# Patient Record
Sex: MALE | Race: AMERICAN INDIAN OR ALASKA NATIVE
[De-identification: names, ages, dates, MRNs, and addresses within clinical notes are randomized per-mention and may not be internally consistent; named-entity substitution may affect disease eponyms.]

---

## 2022-08-08 ENCOUNTER — HOSPITAL ENCOUNTER (INPATIENT)
Dept: HOSPITAL 5 - ED | Age: 77
LOS: 3 days | Discharge: HOME | DRG: 392 | End: 2022-08-11
Attending: STUDENT IN AN ORGANIZED HEALTH CARE EDUCATION/TRAINING PROGRAM | Admitting: HOSPITALIST
Payer: SELF-PAY

## 2022-08-08 DIAGNOSIS — D69.6: ICD-10-CM

## 2022-08-08 DIAGNOSIS — K29.70: ICD-10-CM

## 2022-08-08 DIAGNOSIS — R13.10: Primary | ICD-10-CM

## 2022-08-08 DIAGNOSIS — K44.9: ICD-10-CM

## 2022-08-08 PROCEDURE — 74176 CT ABD & PELVIS W/O CONTRAST: CPT

## 2022-08-08 PROCEDURE — 80053 COMPREHEN METABOLIC PANEL: CPT

## 2022-08-08 PROCEDURE — 83690 ASSAY OF LIPASE: CPT

## 2022-08-08 PROCEDURE — 82962 GLUCOSE BLOOD TEST: CPT

## 2022-08-08 PROCEDURE — 85025 COMPLETE CBC W/AUTO DIFF WBC: CPT

## 2022-08-08 PROCEDURE — 94640 AIRWAY INHALATION TREATMENT: CPT

## 2022-08-08 PROCEDURE — 36415 COLL VENOUS BLD VENIPUNCTURE: CPT

## 2022-08-08 PROCEDURE — 88312 SPECIAL STAINS GROUP 1: CPT

## 2022-08-08 PROCEDURE — 80048 BASIC METABOLIC PNL TOTAL CA: CPT

## 2022-08-08 PROCEDURE — 88305 TISSUE EXAM BY PATHOLOGIST: CPT

## 2022-08-08 NOTE — EMERGENCY DEPARTMENT REPORT
Stated Complaint: PAIN IN CHEST AFTER EATING





- HPI


History of Present Illness: 





77-year-old male with no significant past medical history has been eating but 

reports that he feels like food is getting stuck in his esophagus.  And after 

eating he vomits his food back up.  For about a month now.  No other acute 

symptoms reported.  Patient is able to drink and stay hydrated but with food 

just feels if they get stuck in his throat or just EXTR.





- ROS


Review of Systems: 





After eating patient reports food seems to be lodged in throat.  No other acute 

symptoms reported denies chest pain, shortness of breath, dizziness, weakness.





- Exam


Vital Signs: 


                                   Vital Signs











  08/08/22





  12:57


 


Temperature 98.1 F


 


Pulse Rate 116 H


 


Respiratory 20





Rate 


 


Blood Pressure 148/97





[Right] 


 


O2 Sat by Pulse 100





Oximetry 











Physical Exam: 





No acute distress


A/O x4


Patient amatory with no assistance.


Nonlabored breathing.


Skin dry clean and intact.








MSE screening note: 


Focused history and physical exam performed.


Due to findings the following was ordered:





MSE complete.


Orders to be placed.


Patient to be seen by another provider in the back.


Triage complete.





ED Disposition for MSE


Condition: Stable

## 2022-08-09 ENCOUNTER — OUT OF OFFICE VISIT (OUTPATIENT)
Dept: URBAN - METROPOLITAN AREA MEDICAL CENTER 41 | Facility: MEDICAL CENTER | Age: 77
End: 2022-08-09
Payer: SELF-PAY

## 2022-08-09 DIAGNOSIS — K22.2 ACQUIRED ESOPHAGEAL RING: ICD-10-CM

## 2022-08-09 DIAGNOSIS — K29.60 ADENOPAPILLOMATOSIS GASTRICA: ICD-10-CM

## 2022-08-09 DIAGNOSIS — R11.2 ACUTE NAUSEA WITH NONBILIOUS VOMITING: ICD-10-CM

## 2022-08-09 DIAGNOSIS — R63.0 ALMOST NO APPETITE: ICD-10-CM

## 2022-08-09 DIAGNOSIS — K20.80 ESOPHAGITIS DISSECANS SUPERFICIALIS: ICD-10-CM

## 2022-08-09 DIAGNOSIS — B96.81 BACTERIAL INFECTION DUE TO H. PYLORI: ICD-10-CM

## 2022-08-09 DIAGNOSIS — R13.19 CERVICAL DYSPHAGIA: ICD-10-CM

## 2022-08-09 LAB
ALBUMIN SERPL-MCNC: 4 G/DL (ref 3.9–5)
ALT SERPL-CCNC: 30 UNITS/L (ref 7–56)
BASOPHILS # (AUTO): 0 K/MM3 (ref 0–0.1)
BASOPHILS NFR BLD AUTO: 0.5 % (ref 0–1.8)
BUN SERPL-MCNC: 13 MG/DL (ref 9–20)
BUN/CREAT SERPL: 16 %
CALCIUM SERPL-MCNC: 8.9 MG/DL (ref 8.4–10.2)
EOSINOPHIL # BLD AUTO: 0.2 K/MM3 (ref 0–0.4)
EOSINOPHIL NFR BLD AUTO: 2.5 % (ref 0–4.3)
HCT VFR BLD CALC: 37.7 % (ref 35.5–45.6)
HEMOLYSIS INDEX: 10
HGB BLD-MCNC: 13 GM/DL (ref 11.8–15.2)
LYMPHOCYTES # BLD AUTO: 2.7 K/MM3 (ref 1.2–5.4)
LYMPHOCYTES NFR BLD AUTO: 36.9 % (ref 13.4–35)
MCHC RBC AUTO-ENTMCNC: 35 % (ref 32–34)
MCV RBC AUTO: 78 FL (ref 84–94)
MONOCYTES # (AUTO): 0.7 K/MM3 (ref 0–0.8)
MONOCYTES % (AUTO): 9.4 % (ref 0–7.3)
PLATELET # BLD: 76 K/MM3 (ref 140–440)
RBC # BLD AUTO: 4.8 M/MM3 (ref 3.65–5.03)

## 2022-08-09 PROCEDURE — 99254 IP/OBS CNSLTJ NEW/EST MOD 60: CPT | Performed by: INTERNAL MEDICINE

## 2022-08-09 PROCEDURE — 99245 OFF/OP CONSLTJ NEW/EST HI 55: CPT | Performed by: INTERNAL MEDICINE

## 2022-08-09 PROCEDURE — 99232 SBSQ HOSP IP/OBS MODERATE 35: CPT | Performed by: INTERNAL MEDICINE

## 2022-08-09 PROCEDURE — 43239 EGD BIOPSY SINGLE/MULTIPLE: CPT | Performed by: INTERNAL MEDICINE

## 2022-08-09 PROCEDURE — 43249 ESOPH EGD DILATION <30 MM: CPT | Performed by: INTERNAL MEDICINE

## 2022-08-09 RX ADMIN — FAMOTIDINE SCH MG: 10 INJECTION, SOLUTION INTRAVENOUS at 11:26

## 2022-08-09 RX ADMIN — Medication SCH ML: at 22:01

## 2022-08-09 RX ADMIN — Medication SCH ML: at 10:25

## 2022-08-09 RX ADMIN — FAMOTIDINE SCH MG: 10 INJECTION, SOLUTION INTRAVENOUS at 22:01

## 2022-08-09 RX ADMIN — HEPARIN SODIUM SCH UNIT: 5000 INJECTION, SOLUTION INTRAVENOUS; SUBCUTANEOUS at 15:25

## 2022-08-09 RX ADMIN — HEPARIN SODIUM SCH UNIT: 5000 INJECTION, SOLUTION INTRAVENOUS; SUBCUTANEOUS at 22:01

## 2022-08-09 NOTE — HISTORY AND PHYSICAL REPORT
History of Present Illness


Date of examination: 08/09/22


Date of admission: 


08/09/22


Chief complaint: 





Nausea vomiting diarrhea


History of present illness: 





77-year-old male with no significant past medical history was brought to the 

emergency room because of nausea vomiting.  The patient states for the past 

month he feels as though solid foods get stuck in his chest when he swallows.  

Patient also complains of diffuse abdominal pain when he is full.  The patient e

xplains that whenever he eats solid food feels full and  eventually vomits.  

Patient states he is able to consume liquids.  Patient admits to watery 

diarrhea.  Patient has not yet seen a physician about the above complaints.





In the emergency room CT scan of the abdomen and pelvis without contrast shows 

no acute finding of the abdomen or pelvis.  No evidence of localized bowel 

inflammation or obstruction.  Subsequently Case was discussed with on-call GI 

fellow going to admit the patient and GI will see the patient in the morning








Past History


Past Surgical History: No surgical history


Social history: no significant social history


Family history: no significant family history





Medications and Allergies


                                    Allergies











Allergy/AdvReac Type Severity Reaction Status Date / Time


 


No Known Allergies Allergy   Unverified 08/08/22 13:02














Review of Systems


All systems: negative


Gastrointestinal: abdominal pain, nausea, vomiting, diarrhea





Exam





- Constitutional


Vitals: 


                                        











Temp Pulse Resp BP Pulse Ox


 


 98.1 F   96 H  12   145/95   98 


 


 08/08/22 12:57  08/09/22 04:31  08/09/22 04:31  08/09/22 04:31  08/09/22 04:31











General appearance: Present: no acute distress, well-nourished





- EENT


Eyes: Present: PERRL


ENT: hearing intact, clear oral mucosa





- Neck


Neck: Present: supple, normal ROM





- Respiratory


Respiratory effort: normal


Respiratory: bilateral: CTA





- Cardiovascular


Heart Sounds: Present: S1 & S2.  Absent: rub, click





- Extremities


Extremities: pulses symmetrical, No edema


Peripheral Pulses: within normal limits





- Abdominal


General gastrointestinal: Present: soft, non-tender, non-distended, normal bowel

sounds


Male genitourinary: Present: normal





- Integumentary


Integumentary: Present: clear, warm, dry





- Musculoskeletal


Musculoskeletal: gait normal, strength equal bilaterally





- Psychiatric


Psychiatric: appropriate mood/affect, intact judgment & insight





- Neurologic


Neurologic: CNII-XII intact, moves all extremities





Results





- Labs


CBC & Chem 7: 


                                 08/09/22 01:58





                                 08/09/22 01:58


Labs: 


                             Laboratory Last Values











WBC  7.2 K/mm3 (4.5-11.0)   08/09/22  01:58    


 


RBC  4.80 M/mm3 (3.65-5.03)   08/09/22  01:58    


 


Hgb  13.0 gm/dl (11.8-15.2)   08/09/22  01:58    


 


Hct  37.7 % (35.5-45.6)   08/09/22  01:58    


 


MCV  78 fl (84-94)  L  08/09/22  01:58    


 


MCH  27 pg (28-32)  L  08/09/22  01:58    


 


MCHC  35 % (32-34)  H  08/09/22  01:58    


 


RDW  15.9 % (13.2-15.2)  H  08/09/22  01:58    


 


Plt Count  76 K/mm3 (140-440)  L  08/09/22  01:58    


 


Lymph % (Auto)  36.9 % (13.4-35.0)  H  08/09/22  01:58    


 


Mono % (Auto)  9.4 % (0.0-7.3)  H  08/09/22  01:58    


 


Eos % (Auto)  2.5 % (0.0-4.3)   08/09/22  01:58    


 


Baso % (Auto)  0.5 % (0.0-1.8)   08/09/22  01:58    


 


Lymph # (Auto)  2.7 K/mm3 (1.2-5.4)   08/09/22  01:58    


 


Mono # (Auto)  0.7 K/mm3 (0.0-0.8)   08/09/22  01:58    


 


Eos # (Auto)  0.2 K/mm3 (0.0-0.4)   08/09/22  01:58    


 


Baso # (Auto)  0.0 K/mm3 (0.0-0.1)   08/09/22  01:58    


 


Seg Neutrophils %  50.7 % (40.0-70.0)   08/09/22  01:58    


 


Seg Neutrophils #  3.6 K/mm3 (1.8-7.7)   08/09/22  01:58    


 


Sodium  136 mmol/L (137-145)  L  08/09/22  01:58    


 


Potassium  4.5 mmol/L (3.6-5.0)   08/09/22  01:58    


 


Chloride  102.9 mmol/L ()   08/09/22  01:58    


 


Carbon Dioxide  20 mmol/L (22-30)  L  08/09/22  01:58    


 


Anion Gap  18 mmol/L  08/09/22  01:58    


 


BUN  13 mg/dL (9-20)   08/09/22  01:58    


 


Creatinine  0.8 mg/dL (0.8-1.3)   08/09/22  01:58    


 


Estimated GFR  > 60 ml/min  08/09/22  01:58    


 


BUN/Creatinine Ratio  16 %  08/09/22  01:58    


 


Glucose  127 mg/dL ()  H  08/09/22  01:58    


 


Calcium  8.9 mg/dL (8.4-10.2)   08/09/22  01:58    


 


Total Bilirubin  1.80 mg/dL (0.1-1.2)  H  08/09/22  01:58    


 


AST  14 units/L (5-40)   08/09/22  01:58    


 


ALT  30 units/L (7-56)   08/09/22  01:58    


 


Alkaline Phosphatase  111 units/L ()   08/09/22  01:58    


 


Total Protein  8.0 g/dL (6.3-8.2)   08/09/22  01:58    


 


Albumin  4.0 g/dL (3.9-5)   08/09/22  01:58    


 


Albumin/Globulin Ratio  1.0 %  08/09/22  01:58    


 


Lipase  43 units/L (13-60)   08/09/22  01:58    














- Imaging and Cardiology


CT scan - abdomen: report reviewed





Assessment and Plan


VTE prophylaxis?: Chemical


Plan of care discussed with patient/family: Yes





- Patient Problems


(1) Dysphagia


Current Visit: Yes   Status: Acute   


Plan to address problem: 


Admit the patient to the medical floor telemetry.  NPO.  D5 half-normal saline 

at the rate of 100 cc/h.  Pepcid 20 mg IV every 12 hours.  Zofran 4 mg IV every 

6 hours as needed.  Will consult GI for further evaluation








(2) Nausea & vomiting


Current Visit: Yes   Status: Acute   


Plan to address problem: 


 NPO.  D5 half-normal saline at the rate of 100 cc/h.  Pepcid 20 mg IV every 12 

hours.  Zofran 4 mg IV every 6 hours as needed.  








(3) Abdominal pain


Current Visit: Yes   Status: Acute   


Plan to address problem: 


 NPO.  D5 half-normal saline at the rate of 100 cc/h.  Pepcid 20 mg IV every 12 

hours.  Zofran 4 mg IV every 6 hours as needed.  Morphine 2 mg IV every 4 hours 

as needed








(4) DVT prophylaxis


Current Visit: Yes   Status: Acute   


Plan to address problem: 


Heparin 5000 units subcu every 12 hours for DVT prophylaxis.  Pepcid 20 mg IV 

every 12 hours for GI prophylaxis.  Patient is a full code

## 2022-08-09 NOTE — EVENT NOTE
Date: 08/09/22


Full GI consult dictated


- pt w/ 3 months dysphagia


- EGD in am if schedule permits

## 2022-08-09 NOTE — CAT SCAN REPORT
CT ABDOMEN AND PELVIS WITHOUT CONTRAST



INDICATION / CLINICAL INFORMATION: Intractable n/v, intolerance of solid food.



TECHNIQUE: Axial CT images were obtained through the abdomen and pelvis without IV contrast.  All CT 
scans at this location are performed using CT dose reduction for ALARA by means of automated exposure
 control. 



COMPARISON: None available.



FINDINGS:



LOWER CHEST: There is bibasilar volume loss.

LIVER: No significant abnormality

GALLBLADDER/BILIARY TREE: No significant abnormality  

PANCREAS: No significant abnormality

SPLEEN: Mildly enlarged, measuring 13.8 cm.

ADRENALS: No significant abnormality

RIGHT KIDNEY / URETER: No acute findings. Renal cysts.

LEFT KIDNEY / URETER: No acute findings. Tiny left renal cyst.

URINARY BLADDER: No significant abnormality

REPRODUCTIVE ORGANS: Prostate is enlarged.

STOMACH / BOWEL: Small bowel and colon demonstrate no evidence of mechanical obstruction or inflammat
ion. The appendix is normal.

LYMPH NODES: No significant adenopathy.

VASCULATURE: No significant abnormality. 

OTHER: No free air, free fluid, or focal fluid collection is identified. Small left inguinal hernia c
ontains noninflamed fat.



SKELETAL SYSTEM: No acute osseous findings.



IMPRESSION:



1. No acute findings of the abdomen or pelvis. No evidence of localized bowel inflammation or obstruc
tion.

2. Nonspecific mild splenomegaly.

3. Other incidental findings as above.



Signer Name: Derick Matos MD 

Signed: 8/9/2022 3:03 AM

Workstation Name: [id number]

## 2022-08-09 NOTE — EVENT NOTE
Date: 08/09/22





Patient was evaluated this morning, he was found to be hemodynamically stable.





#Dysphagia


#Nausea and vomiting


 Speech therapy consulted for bedside evaluation; pending recs.


 Gastroenterology consulted; pending recs.  Pending upper endoscopy tomorrow 

morning with GI.


 Patient currently NPO.  Continue D5 half-normal saline at 100 cc/hour.


Continue as needed analgesics.  Continue IV Pepcid 20 mg twice daily.





#Hyperbilirubinemia


- Total bilirubin 1.8


- Continue to monitor with repeat CMP. 





#Thrombocytopenia


- platelets 76. Transfuse if platelets <50K and pending procedure, <20K with 

cutaneous bleeding, or <15K. 


- continue to monitor. 





#Coordination of CARE time: 30 minutes.  Total visit time equals 30 or more 

minutes with greater than 50% spent face-to-face on coordination of care and 

counseling.





#Advanced care planning


-Disease education conducted, care plan discussed, diagnoses discussed, 

prognosis discussed, and patient acknowledges understanding with care plan


-Time: +30 min

## 2022-08-09 NOTE — EMERGENCY DEPARTMENT REPORT
HPI





- General


Chief Complaint: Nausea/Vomiting/Diarrhea


Time Seen by Provider: 22 01:17





- HPI


HPI: 





Room 22











The patient is a 77-year-old male present with chief complaint of nausea 

vomiting.  The patient states for the past month he feels as though solid foods 

get stuck in his chest when he swallows.  Patient also complains of diffuse 

abdominal pain when he is full.  The patient explains that whenever he eats 

solid food feels like his sister and eventually vomits.  Patient states he is 

able to consume liquids.  Patient admits to watery diarrhea.  Patient has not 

yet seen a physician about the above complaints.





ED Past Medical Hx





- Past Medical History


Previous Medical History?: No





- Surgical History


Past Surgical History?: No





- Family History


Family history: no significant





- Social History


Smoking Status: Never Smoker


Substance Use Type: None (Denies illicit drug use)





ED Review of Systems


ROS: 


Stated complaint: PAIN IN CHEST AFTER EATING


Other details as noted in HPI





Constitutional: denies: fever


Eyes: denies: eye pain


ENT: denies: throat pain


Cardiovascular: denies: chest pain


Endocrine: no symptoms reported


Gastrointestinal: abdominal pain, nausea, vomiting, diarrhea


Genitourinary: denies: dysuria


Musculoskeletal: denies: back pain


Neurological: denies: headache





Physical Exam





- Physical Exam


Vital Signs: 


                                   Vital Signs











  22





  12:57 01:19 01:31


 


Temperature 98.1 F  


 


Pulse Rate 116 H 98 H 98 H


 


Respiratory 20 16 16





Rate   


 


Blood Pressure   143/99


 


Blood Pressure 148/97  





[Right]   


 


O2 Sat by Pulse 100 98 100





Oximetry   











Physical Exam: 





GENERAL: The patient is well-developed well-nourished male lying on stretcher 

not appearing to be in acute distress. []


HEENT: Normocephalic.  Atraumatic.  Extraocular motions are intact.  Patient has

 moist mucous membranes.


NECK: Supple.  Trachea midline


CHEST/LUNGS: Clear to auscultation.  There is no respiratory distress noted.


HEART/CARDIOVASCULAR: Regular.  There is no tachycardia.  There is no gallop rub

 or murmur.


ABDOMEN: Abdomen is soft, nontender.  Patient has normal bowel sounds.  There is

 no abdominal distention.


SKIN: There is no rash.  There is no edema.  There is no diaphoresis.


NEURO: The patient is awake, alert, and oriented.  The patient is cooperative.  

The patient has no focal neurologic deficits.  The patient has normal speech.  

GCS 15


MUSCULOSKELETAL:There is no evidence of acute injury.





ED Course


                                   Vital Signs











  22





  12:57 01:19 01:31


 


Temperature 98.1 F  


 


Pulse Rate 116 H 98 H 98 H


 


Respiratory 20 16 16





Rate   


 


Blood Pressure   143/99


 


Blood Pressure 148/97  





[Right]   


 


O2 Sat by Pulse 100 98 100





Oximetry   














- Consultations


Consultation #1: 





22 03:26


GI paged


22 03:33


Case discussed with gastroenterologist Dr. Sims- agree with admission.  Will 

consult





ED Medical Decision Making





- Lab Data


Result diagrams: 


                                 22 01:58





                                 22 01:58





                                Laboratory Tests











  22





  01:58 01:58


 


WBC  7.2 


 


RBC  4.80 


 


Hgb  13.0 


 


Hct  37.7 


 


MCV  78 L 


 


MCH  27 L 


 


MCHC  35 H 


 


RDW  15.9 H 


 


Plt Count  76 L 


 


Lymph % (Auto)  36.9 H 


 


Mono % (Auto)  9.4 H 


 


Eos % (Auto)  2.5 


 


Baso % (Auto)  0.5 


 


Lymph # (Auto)  2.7 


 


Mono # (Auto)  0.7 


 


Eos # (Auto)  0.2 


 


Baso # (Auto)  0.0 


 


Seg Neutrophils %  50.7 


 


Seg Neutrophils #  3.6 


 


Sodium   136 L


 


Potassium   4.5


 


Chloride   102.9


 


Carbon Dioxide   20 L


 


Anion Gap   18


 


BUN   13


 


Creatinine   0.8


 


Estimated GFR   > 60


 


BUN/Creatinine Ratio   16


 


Glucose   127 H


 


Calcium   8.9


 


Total Bilirubin   1.80 H


 


AST   14


 


ALT   30


 


Alkaline Phosphatase   111


 


Total Protein   8.0


 


Albumin   4.0


 


Albumin/Globulin Ratio   1.0


 


Lipase   43














- Radiology Data


Radiology results: report reviewed (CT abdomen pelvis), image reviewed (CT 

abdomen pelvis)





[facility]  


                                     [address] [place] [address]  


 


                                          Cat Scan Report   


                                               Signed  


 


Patient: DAVID GUERRERO                                                             

  MR#: [id number]    


 


: 1945                                                                

Acct:[id number]      


 


Age/Sex: 77 / M                                                                

ADM Date: 22     


 


Loc: ED       


Attending Dr:   


 


 


Ordering Physician: NEISHA PIMENTEL MD  


Date of Service: 22  


Procedure(s): CT abdomen pelvis wo con  


Accession Number(s): [id number]  


 


cc: NEISHA PIMENTEL MD   


 


 


CT ABDOMEN AND PELVIS WITHOUT CONTRAST  


 


 INDICATION / CLINICAL INFORMATION: Intractable n/v, intolerance of solid food. 




 


 TECHNIQUE: Axial CT images were obtained through the abdomen and pelvis without

IV contrast.  All 


CT scans at this location are performed using CT dose reduction for ALARA by 

means of automated 


exposure control.   


 


 COMPARISON: None available.  


 


 FINDINGS:  


 


 LOWER CHEST: There is bibasilar volume loss.  


 LIVER: No significant abnormality  


 GALLBLADDER/BILIARY TREE: No significant abnormality    


 PANCREAS: No significant abnormality  


 SPLEEN: Mildly enlarged, measuring 13.8 cm.  


 ADRENALS: No significant abnormality  


 RIGHT KIDNEY / URETER: No acute findings. Renal cysts.  


 LEFT KIDNEY / URETER: No acute findings. Tiny left renal cyst.  


 URINARY BLADDER: No significant abnormality  


 REPRODUCTIVE ORGANS: Prostate is enlarged.  


 STOMACH / BOWEL: Small bowel and colon demonstrate no evidence of mechanical 

obstruction or 


inflammation. The appendix is normal.  


 LYMPH NODES: No significant adenopathy.  


 VASCULATURE: No significant abnormality.   


 OTHER: No free air, free fluid, or focal fluid collection is identified. Small 

left inguinal hernia


contains noninflamed fat.  


 


 SKELETAL SYSTEM: No acute osseous findings.  


 


 IMPRESSION:  


 


 1. No acute findings of the abdomen or pelvis. No evidence of localized bowel 

inflammation or 


obstruction.  


 2. Nonspecific mild splenomegaly.  


 3. Other incidental findings as above.  


 


 Signer Name: Michelle Matos MD   


 Signed: 2022 3:03 AM  


 Workstation Name: [id number]   


 


 


Transcribed By: JS  


Dictated By: MICHELLE MATOS MD  


Electronically Authenticated By: MICHELLE MATOS MD    


Signed Date/Time: 22 0303                                


 


 


 


DD/DT: 22 0259                                                            

 


TD/TT:











- Differential Diagnosis


Achalasia, esophageal web, esophageal ring, small bowel obstruction


Critical care attestation.: 


If time is entered above; I have spent that time in minutes in the direct care 

of this critically ill patient, excluding procedure time.








ED Disposition


Clinical Impression: 


 Dysphagia





Disposition:  ADMITTED AS INPATIENT


Is pt being admited?: Yes


Does the pt Need Aspirin: No


Condition: Fair


Referrals: 


PRIMARY CARE,MD [Primary Care Provider] - 3-5 Days


Time of Disposition: 03:34 (Care transferred to hospitalist (Dr. Robertson))

## 2022-08-10 LAB
ALBUMIN SERPL-MCNC: 3.8 G/DL (ref 3.9–5)
ALT SERPL-CCNC: 30 UNITS/L (ref 7–56)
BASOPHILS # (AUTO): 0 K/MM3 (ref 0–0.1)
BASOPHILS NFR BLD AUTO: 0.4 % (ref 0–1.8)
BUN SERPL-MCNC: 10 MG/DL (ref 9–20)
BUN/CREAT SERPL: 11 %
CALCIUM SERPL-MCNC: 8.6 MG/DL (ref 8.4–10.2)
EOSINOPHIL # BLD AUTO: 0.3 K/MM3 (ref 0–0.4)
EOSINOPHIL NFR BLD AUTO: 5.2 % (ref 0–4.3)
HCT VFR BLD CALC: 34.8 % (ref 35.5–45.6)
HEMOLYSIS INDEX: 0
HGB BLD-MCNC: 12.1 GM/DL (ref 11.8–15.2)
LYMPHOCYTES # BLD AUTO: 2.3 K/MM3 (ref 1.2–5.4)
LYMPHOCYTES NFR BLD AUTO: 43.1 % (ref 13.4–35)
MCHC RBC AUTO-ENTMCNC: 35 % (ref 32–34)
MCV RBC AUTO: 79 FL (ref 84–94)
MONOCYTES # (AUTO): 0.5 K/MM3 (ref 0–0.8)
MONOCYTES % (AUTO): 10.1 % (ref 0–7.3)
PLATELET # BLD: 66 K/MM3 (ref 140–440)
RBC # BLD AUTO: 4.42 M/MM3 (ref 3.65–5.03)

## 2022-08-10 RX ADMIN — FAMOTIDINE SCH MG: 10 INJECTION, SOLUTION INTRAVENOUS at 10:06

## 2022-08-10 RX ADMIN — FAMOTIDINE SCH MG: 10 INJECTION, SOLUTION INTRAVENOUS at 21:57

## 2022-08-10 RX ADMIN — Medication SCH ML: at 10:06

## 2022-08-10 RX ADMIN — Medication SCH ML: at 21:57

## 2022-08-10 RX ADMIN — HEPARIN SODIUM SCH: 5000 INJECTION, SOLUTION INTRAVENOUS; SUBCUTANEOUS at 05:38

## 2022-08-10 NOTE — CONSULTATION
DATE OF CONSULTATION: 08/09/2022



REFERRING PHYSICIAN:  Dr. Isabel Calhoun.



INDICATIONS:

1.  Nausea.

2.  Dysphagia.



HISTORY OF PRESENT ILLNESS:  A 77-year-old black male presents with nausea and 

vomiting.  The patient reports for the last 3 months, he has had progressive 

dysphagia where food is getting stuck along the mid esophagus.  This led to 

decreased appetite and some weight loss.  The patient denies a history of reflux

prior.  The patient did report some nausea and vomiting.  The patient reports 

some loose stools.  The patient subsequently came to the Emergency Room where he

was evaluated, admitted and GI consulted.  The patient denies any other specific

complaints.



PAST MEDICAL HISTORY:  Negative.



PAST SURGICAL HISTORY:  Negative.



MEDICATIONS:  Reviewed and updated in chart.



ALLERGIES:  No known drug allergies.



SOCIAL HISTORY:  Denies alcohol, tobacco or drug abuse.



FAMILY HISTORY: Negative for colon cancer, IBD, or liver disease..



REVIEW OF SYSTEMS:

GENERAL:  Reports some weakness.

HEENT:  Denies visual complaints or tinnitus.

PULMONARY:  No shortness of breath, chest pain.

GASTROINTESTINAL:  Reports problems swallowing and dysphagia.

All points of 13-point review of system otherwise negative.



PHYSICAL EXAMINATION:

VITAL SIGNS:  Temperature of 98.1, pulse 86, respirations 18, blood pressure 

138/94.

GENERAL:  Fairly thin black male in no acute distress.

HEENT:  Pupils round and reactive.

PULMONARY:  Clear to auscultation bilaterally.

CARDIOVASCULAR:  Regular rate and rhythm.  Normal S1, S2.

ABDOMEN:  Positive bowel sounds, soft.

SKIN:  No obvious rashes.



LABORATORY DATA:  Pertinent for white count of 7.2, hemoglobin and hematocrit of

13 and 37.7, platelet count of 76.  Chem-7 within normal limits.  LFTs within 

normal limits.  CT scan abdomen and pelvis without contrast on 08/08/2022 showed

no significant intraabdominal process except for mild splenomegaly.



ASSESSMENT:  A 77-year-old black male presents with 3 months of dysphagia to 

solids progressively worse with decreased appetite and nausea, vomiting and 

unable to get food down.  The patient's CT scan was otherwise benign.  He does 

report some weight loss.  Concern for upper esophageal pathology.



PLAN:

1.  Will review CT scan.

2.  N.p.o. after midnight.

3.  PPI daily.

4.  Planned EGD with possible dilatation in a.m.







DD: 08/09/2022 03:10 PM

DT: 08/10/2022 03:06 AM

TID: [id number] RECEIPT: [id number]

FAITH/MAR

## 2022-08-10 NOTE — GASTROENTEROLOGY PROGRESS NOTE
Assessment and Plan


1. GI: pt w/ dysphagia


- egd in am


- continue current meds and diet








Subjective


Date of service: 08/10/22


Interval history: 


- no new GI issues overnight








Objective





- Constitutional


Vitals: 


                                        











Temp Pulse Resp BP Pulse Ox


 


 99.0 F   94 H  18   121/84   98 


 


 08/10/22 13:21  08/10/22 13:21  08/10/22 13:21  08/10/22 13:21  08/10/22 13:50











General appearance: no acute distress





- EENT


Eyes: PERRL





- Cardiovascular


Rhythm: regular


Heart Sounds: Present: S1 & S2





- Gastrointestinal


General gastrointestinal: Present: soft, non-tender, non-distended





- Labs


CBC & Chem 7: 


                                 08/10/22 04:33





                                 08/10/22 04:33


Labs: 


                         Laboratory Results - last 24 hr











  08/10/22 08/10/22





  04:33 04:33


 


WBC  5.3 


 


RBC  4.42 


 


Hgb  12.1 


 


Hct  34.8 L 


 


MCV  79 L 


 


MCH  27 L 


 


MCHC  35 H 


 


RDW  15.9 H 


 


Plt Count  66 L 


 


Lymph % (Auto)  43.1 H 


 


Mono % (Auto)  10.1 H 


 


Eos % (Auto)  5.2 H 


 


Baso % (Auto)  0.4 


 


Lymph # (Auto)  2.3 


 


Mono # (Auto)  0.5 


 


Eos # (Auto)  0.3 


 


Baso # (Auto)  0.0 


 


Seg Neutrophils %  41.2 


 


Seg Neutrophils #  2.2 


 


Sodium   137


 


Potassium   3.9


 


Chloride   105.2


 


Carbon Dioxide   25


 


Anion Gap   11


 


BUN   10


 


Creatinine   0.9


 


Estimated GFR   > 60


 


BUN/Creatinine Ratio   11


 


Glucose   118 H


 


Calcium   8.6


 


Total Bilirubin   1.90 H


 


AST   13


 


ALT   30


 


Alkaline Phosphatase   97


 


Total Protein   7.5


 


Albumin   3.8 L


 


Albumin/Globulin Ratio   1.0

## 2022-08-10 NOTE — PROGRESS NOTE
Assessment and Plan


Assessment and plan: 





Patient was evaluated this morning, he was found to be hemodynamically stable.





#Dysphagia


#Nausea and vomiting


 Speech therapy consulted for bedside evaluation; pending recs.


 Gastroenterology consulted; pending recs.  Pending upper endoscopy today with 

GI.


 Patient currently NPO.  Continue D5 half-normal saline at 100 cc/hour.


 Continue as needed antiemetics.  Continue IV Pepcid 20 mg twice daily.





#Hyperbilirubinemia


- Total bilirubin 1.8


- Continue to monitor with repeat CMP. 





#Thrombocytopeniaworsening


- platelets 76-->66


 Transfuse if platelets <50K and pending procedure, <20K with cutaneous 

bleeding, or <15K. 


- continue to monitor. 





#Advanced care planning


-Disease education conducted, care plan discussed, diagnoses discussed, 

prognosis discussed, and patient acknowledges understanding with care plan


-Time: +30 min


Disposition Plan: Continue medical management


Total Time Spent with Patient (Minutes): 45 minutes





History


Interval history: 





No acute events over night. The patient denies fevers, chills, nausea, vomiting,

abdominal pain, chest pain/pressure, shortness of breath, urinary symptoms, 

weakness, or confusion.





Hospitalist Physical





- Constitutional


Vitals: 


                                        











Temp Pulse Resp BP Pulse Ox


 


 98.1 F   90   20   136/83   97 


 


 08/09/22 16:49  08/09/22 21:12  08/09/22 21:12  08/09/22 21:12  08/10/22 09:20











General appearance: Present: no acute distress, well-nourished





- EENT


Eyes: Present: PERRL, EOM intact


ENT: hearing intact, clear oral mucosa, dentition normal





- Neck


Neck: Present: supple, normal ROM





- Respiratory


Respiratory effort: normal


Respiratory: bilateral: CTA





- Cardiovascular


Rhythm: regular


Heart Sounds: Present: S1 & S2





- Extremities


Extremities: no ischemia, pulses intact, pulses symmetrical, No edema, normal 

temperature, normal color, Full ROM


Peripheral Pulses: within normal limits





- Abdominal


General gastrointestinal: soft, non-tender, non-distended, normal bowel sounds





- Integumentary


Integumentary: Present: clear, warm, dry





- Psychiatric


Psychiatric: appropriate mood/affect, intact judgment & insight, memory intact, 

cooperative





- Neurologic


Neurologic: CNII-XII intact, moves all extremities, gait normal





- Allied Health


Allied health notes reviewed: nursing





Results





- Labs


CBC & Chem 7: 


                                 08/10/22 04:33





                                 08/10/22 04:33


Labs: 


                             Laboratory Last Values











WBC  5.3 K/mm3 (4.5-11.0)   08/10/22  04:33    


 


RBC  4.42 M/mm3 (3.65-5.03)   08/10/22  04:33    


 


Hgb  12.1 gm/dl (11.8-15.2)   08/10/22  04:33    


 


Hct  34.8 % (35.5-45.6)  L  08/10/22  04:33    


 


MCV  79 fl (84-94)  L  08/10/22  04:33    


 


MCH  27 pg (28-32)  L  08/10/22  04:33    


 


MCHC  35 % (32-34)  H  08/10/22  04:33    


 


RDW  15.9 % (13.2-15.2)  H  08/10/22  04:33    


 


Plt Count  66 K/mm3 (140-440)  L  08/10/22  04:33    


 


Lymph % (Auto)  43.1 % (13.4-35.0)  H  08/10/22  04:33    


 


Mono % (Auto)  10.1 % (0.0-7.3)  H  08/10/22  04:33    


 


Eos % (Auto)  5.2 % (0.0-4.3)  H  08/10/22  04:33    


 


Baso % (Auto)  0.4 % (0.0-1.8)   08/10/22  04:33    


 


Lymph # (Auto)  2.3 K/mm3 (1.2-5.4)   08/10/22  04:33    


 


Mono # (Auto)  0.5 K/mm3 (0.0-0.8)   08/10/22  04:33    


 


Eos # (Auto)  0.3 K/mm3 (0.0-0.4)   08/10/22  04:33    


 


Baso # (Auto)  0.0 K/mm3 (0.0-0.1)   08/10/22  04:33    


 


Seg Neutrophils %  41.2 % (40.0-70.0)   08/10/22  04:33    


 


Seg Neutrophils #  2.2 K/mm3 (1.8-7.7)   08/10/22  04:33    


 


Sodium  137 mmol/L (137-145)   08/10/22  04:33    


 


Potassium  3.9 mmol/L (3.6-5.0)   08/10/22  04:33    


 


Chloride  105.2 mmol/L ()   08/10/22  04:33    


 


Carbon Dioxide  25 mmol/L (22-30)   08/10/22  04:33    


 


Anion Gap  11 mmol/L  08/10/22  04:33    


 


BUN  10 mg/dL (9-20)   08/10/22  04:33    


 


Creatinine  0.9 mg/dL (0.8-1.3)   08/10/22  04:33    


 


Estimated GFR  > 60 ml/min  08/10/22  04:33    


 


BUN/Creatinine Ratio  11 %  08/10/22  04:33    


 


Glucose  118 mg/dL ()  H  08/10/22  04:33    


 


POC Glucose  111 mg/dL ()  H  08/09/22  08:25    


 


Calcium  8.6 mg/dL (8.4-10.2)   08/10/22  04:33    


 


Total Bilirubin  1.90 mg/dL (0.1-1.2)  H  08/10/22  04:33    


 


AST  13 units/L (5-40)   08/10/22  04:33    


 


ALT  30 units/L (7-56)   08/10/22  04:33    


 


Alkaline Phosphatase  97 units/L ()   08/10/22  04:33    


 


Total Protein  7.5 g/dL (6.3-8.2)   08/10/22  04:33    


 


Albumin  3.8 g/dL (3.9-5)  L  08/10/22  04:33    


 


Albumin/Globulin Ratio  1.0 %  08/10/22  04:33    


 


Lipase  43 units/L (13-60)   08/09/22  01:58    











Sanders/IV: 


                                        





Voiding Method                   Toilet











Active Medications





- Current Medications


Current Medications: 














Generic Name Dose Route Start Last Admin





  Trade Name Freq  PRN Reason Stop Dose Admin


 


Acetaminophen  650 mg  08/09/22 06:00 





  Acetaminophen 325 Mg Tab  PO  





  Q4H PRN  





  Pain MILD(1-3)/Fever >100.5/HA  


 


Albuterol  2.5 mg  08/09/22 05:30 





  Albuterol 2.5 Mg/3 Ml Nebu  IH  





  Q3HRT PRN  





  Shortness Of Breath  


 


Albuterol/Ipratropium  1 ampul  08/09/22 08:33 





  Ipratropium/Albuterol Sulfate 3 Ml Ampul.Neb  IH  





  Q6HRT PRN  





  Shortness of breath  


 


Famotidine  20 mg  08/09/22 10:00  08/09/22 22:01





  Famotidine 20 Mg/2 Ml Inj  IV   20 mg





  BID CLARK   Administration


 


Heparin Sodium (Porcine)  5,000 unit  08/09/22 14:00  08/10/22 05:38





  Heparin 5,000 Unit/1 Ml Vial  SUB-Q   Not Given





  Q8HR CLARK  


 


Dextrose/Sodium Chloride  1,000 mls @ 100 mls/hr  08/09/22 06:00  08/10/22 05:33





  D5/0.45ns  IV   100 mls/hr





  AS DIRECT CLARK   Administration


 


Morphine Sulfate  2 mg  08/09/22 05:30 





  Morphine 2 Mg/1 Ml Inj  IV  





  Q4H PRN  





  Pain, Moderate (4-6)  


 


Morphine Sulfate  4 mg  08/09/22 06:00 





  Morphine 4 Mg/1 Ml Inj  IV  





  Q4H PRN  





  Pain , Severe (7-10)  


 


Ondansetron HCl  4 mg  08/09/22 06:00 





  Ondansetron 4 Mg/2 Ml Inj  IV  





  Q8H PRN  





  Nausea And Vomiting  


 


Sodium Chloride  10 ml  08/09/22 10:00  08/09/22 22:01





  Sodium Chloride 0.9% 10 Ml Flush Syringe  IV   10 ml





  BID CLARK   Administration


 


Sodium Chloride  10 ml  08/09/22 05:30 





  Sodium Chloride 0.9% 10 Ml Flush Syringe  IV  





  PRN PRN  





  LINE FLUSH

## 2022-08-11 ENCOUNTER — P2P PATIENT RECORD (OUTPATIENT)
Age: 77
End: 2022-08-11

## 2022-08-11 VITALS — DIASTOLIC BLOOD PRESSURE: 75 MMHG | SYSTOLIC BLOOD PRESSURE: 135 MMHG

## 2022-08-11 LAB
BASOPHILS # (AUTO): 0 K/MM3 (ref 0–0.1)
BASOPHILS NFR BLD AUTO: 0.5 % (ref 0–1.8)
BUN SERPL-MCNC: 8 MG/DL (ref 9–20)
BUN/CREAT SERPL: 10 %
CALCIUM SERPL-MCNC: 8.1 MG/DL (ref 8.4–10.2)
EOSINOPHIL # BLD AUTO: 0.3 K/MM3 (ref 0–0.4)
EOSINOPHIL NFR BLD AUTO: 4.7 % (ref 0–4.3)
HCT VFR BLD CALC: 34.5 % (ref 35.5–45.6)
HEMOLYSIS INDEX: 10
HGB BLD-MCNC: 11.8 GM/DL (ref 11.8–15.2)
LYMPHOCYTES # BLD AUTO: 2.2 K/MM3 (ref 1.2–5.4)
LYMPHOCYTES NFR BLD AUTO: 38.4 % (ref 13.4–35)
MCHC RBC AUTO-ENTMCNC: 34 % (ref 32–34)
MCV RBC AUTO: 79 FL (ref 84–94)
MONOCYTES # (AUTO): 0.6 K/MM3 (ref 0–0.8)
MONOCYTES % (AUTO): 9.8 % (ref 0–7.3)
PLATELET # BLD: 63 K/MM3 (ref 140–440)
RBC # BLD AUTO: 4.35 M/MM3 (ref 3.65–5.03)

## 2022-08-11 PROCEDURE — 0DB48ZX EXCISION OF ESOPHAGOGASTRIC JUNCTION, VIA NATURAL OR ARTIFICIAL OPENING ENDOSCOPIC, DIAGNOSTIC: ICD-10-PCS | Performed by: INTERNAL MEDICINE

## 2022-08-11 PROCEDURE — 0D758ZZ DILATION OF ESOPHAGUS, VIA NATURAL OR ARTIFICIAL OPENING ENDOSCOPIC: ICD-10-PCS | Performed by: INTERNAL MEDICINE

## 2022-08-11 RX ADMIN — Medication SCH ML: at 10:02

## 2022-08-11 NOTE — POST OPERATIVE NOTE
Pre-op diagnosis: dysphagia


Post-op diagnosis: same


Findings: 


EGD: 4 cm hiatal hernia


- Grade I esophagitis g-e junction (bx's)


- ring g-e junction noted, Balloon 20mm dilation performed


- mild gastritis (bx's)


- otherwise normal





Procedure: 


egd w/ bx's and balloon dilation





Anesthesia: MAC


Surgeon: GHANSHYAM RAJAN


Estimated blood loss: none


Pathology: list


Specimen disposition: to lab


Condition: stable


Disposition: floor

## 2022-08-11 NOTE — OPERATIVE REPORT
DATE OF SURGERY: 08/11/2022



DATE OF PROCEDURE:  08/11/2022



PROCEDURES:  EGD with cold biopsies and balloon dilatation.



INDICATIONS:

1.  Dysphagia.

2.  GERD.



MEDICATIONS:  Propofol per CRNA.



COMPLICATIONS:  None.



DESCRIPTION OF PROCEDURE:  The patient was brought to the procedure suite.  The 

patient had the procedure discussed with him at length.  All risks, 

complications, and benefits discussed with the patient's brother who gave verbal

permission for the procedure to be performed.  The patient was placed in left 

lateral decubitus position.  Mouth block was placed in patient's oral cavity.  

After adequate sedation with medication as above, endoscope placed in the mouth 

and brought to level of second portion of duodenum.  Retroflexion view 

performed.  The patient's vital signs remained stable throughout the procedure.



FINDINGS:  GE junction is at 36 cm from the gums.  There is a nonobstructing 

ring noted at GE junction.  There was grade 1 esophagitis noted at the GE 

junction.  Biopsies were taken and sent to pathology.  The esophagus otherwise 

appeared to be normal.  There is mild gastritis noted.  Biopsies were taken and 

sent to pathology.  Remaining stomach otherwise appeared to be normal.  The 

duodenum appeared to be normal.  Retroflexion view performed in the stomach 

showed no other pathology other than noted above.  After this inspection using 

standard technique, a TTS balloon was then used to dilate the ring with 20 mm 

balloon.  Post-procedure appearance was satisfactory.  The patient tolerated the

procedure well.  No complications during this procedure.



IMPRESSION:

1.  Nonobstructing ring noted at GE junction, status post balloon dilatation as 

noted above.

2.  Hiatal hernia.

3.  Grade 1 esophagitis with biopsies performed.

4.  Mild gastritis, biopsies performed.

5.  Otherwise, normal esophagogastroduodenoscopy.



RECOMMENDATIONS:

1.  Follow up biopsy results.

2.  If H. pylori positive, we will treat.

3.  PPI daily.

4.  Soft diet, advance as tolerated.

5.  If tolerating p.o., okay to discharge from GI standpoint.







DD: 08/11/2022 09:15 AM

DT: 08/11/2022 09:47 AM

TID: [id number] RECEIPT: [id number]

FAITH/NGUYEN/CÉSAR



cc:     _____ _____

## 2022-08-11 NOTE — DISCHARGE SUMMARY
Providers





- Providers


Date of Admission: 


08/09/22 05:07





Date of discharge: 08/11/22


Attending physician: 


TRICIA SALAZAR MD





                                        





08/09/22 03:31


Consult to Physician [CONS] Urgent 


   Comment: 


   Consulting Provider: APRYL CLAROS


   Physician Instructions: 


   Reason For Exam: Dysphagia





08/09/22 13:18


Speech Therapy Evaluation and Treat [CONS] Routine 


   Reason For Exam: Dysphagia workup











Primary care physician: 


PRIMARY CARE MD








Hospitalization


Reason for admission: Dysphagia


Condition: Fair


Pertinent studies: 





Reviewed.


Procedures: 





EGD with balloon dilation of GE junction


Hospital course: 





The patient is a 77-year-old male with no significant past medical history who 

presented to the ED with complaints of nausea, vomiting, and progressive 

dysphagia to solids getting stuck in his chest when he swallows.  Patient also 

complained of diffuse abdominal pain when he is full/associated.  Patient 

described that whenever he eats solid foods he feels full and eventually this 

leads to vomiting.  He did endorse being able to consume liquids without any 

complications.  Patient denies following up with a physician and/or 

gastroenterologist for the the symptoms.  In the ED, the patient was 

hemodynamically stable with labs remarkable for platelets 76, bicarb 20, and 

glucose 127.  Patient was admitted for further management, gastroenterology was 

consulted for management of dysphagia.  Gastroenterology performed an EGD 

revealing 4 cm hiatal hernia with grade 1 esophagitis at GE junction; ring GE 

junction noted; balloon dilation performed; stomach revealing mild gastritis; 

biopsies taken at GE junction and stomach.  Patient will be started back on p.o.

 Protonix 40 mg daily, the patient will follow-up with gastroenterology 

regarding his biopsies in the outpatient setting.  Patient is medically clear 

for discharge.


Disposition: 01 HOME / SELF CARE / HOMELESS


Final Discharge Diagnosis (Prints w/discharge instructions): Dysphagia, nausea 

and vomiting, hyperbilirubinemia, thrombocytopenia


Time spent for discharge: 45 min 





Core Measure Documentation





- Palliative Care


Palliative Care/ Comfort Measures: Not Applicable





- Core Measures


Any of the following diagnoses?: none





Exam





- Constitutional


Vitals: 


                                        











Temp Pulse Resp BP Pulse Ox


 


 97.0 F L  88   15   135/75   98 


 


 08/11/22 09:30  08/11/22 09:30  08/11/22 09:30  08/11/22 09:30  08/11/22 09:52











General appearance: Present: no acute distress, well-nourished





- EENT


Eyes: Present: PERRL, EOM intact


ENT: hearing intact, clear oral mucosa, dentition normal





- Neck


Neck: Present: supple, normal ROM





- Respiratory


Respiratory effort: normal


Respiratory: bilateral: CTA





- Cardiovascular


Rhythm: regular


Heart Sounds: Present: S1 & S2





- Extremities


Extremities: no ischemia, pulses intact, pulses symmetrical, No edema, normal 

temperature, normal color, Full ROM


Peripheral Pulses: within normal limits





- Abdominal


General gastrointestinal: Present: soft, non-tender, non-distended, normal bowel

 sounds


Male genitourinary: Present: deferred





- Rectal


Rectal Exam: deferred





- Integumentary


Integumentary: Present: clear, warm, dry





- Musculoskeletal


Musculoskeletal: strength equal bilaterally





- Psychiatric


Psychiatric: appropriate mood/affect, intact judgment & insight, memory intact, 

cooperative





- Neurologic


Neurologic: CNII-XII intact, moves all extremities





- Allied Health


Allied health notes reviewed: nursing





Plan


Activity: no restrictions


Diet: regular


Additional Instructions: The patient is a 77-year-old male with no significant 

past medical history who presented to the ED with complaints of nausea, 

vomiting, and progressive dysphagia to solids getting stuck in his chest when he

 swallows.  Patient also complained of diffuse abdominal pain when he is 

full/associated.  Patient described that whenever he eats solid foods he feels 

full and eventually this leads to vomiting.  He did endorse being able to 

consume liquids without any complications.  Patient denies following up with a 

physician and/or gastroenterologist for the the symptoms.  In the ED, the 

patient was hemodynamically stable with labs remarkable for platelets 76, bicarb

 20, and glucose 127.  Patient was admitted for further management, 

gastroenterology was consulted for management of dysphagia.  Gastroenterology 

performed an EGD revealing 4 cm hiatal hernia with grade 1 esophagitis at GE 

junction; ring GE junction noted; balloon dilation performed; stomach revealing 

mild gastritis; biopsies taken at GE junction and stomach.  Patient will be 

started back on p.o. Protonix 40 mg daily, the patient will follow-up with 

gastroenterology regarding his biopsies in the outpatient setting.  Patient is 

medically clear for discharge.


Care Plan Goals: 


Patient is medically clear for discharge.


Assessment: 


The patient is a 77-year-old male with no significant past medical history who 

presented to the ED with complaints of nausea, vomiting, and progressive 

dysphagia to solids getting stuck in his chest when he swallows.  Patient also 

complained of diffuse abdominal pain when he is full/associated.  Patient desc

ribed that whenever he eats solid foods he feels full and eventually this leads 

to vomiting.  He did endorse being able to consume liquids without any 

complications.  Patient denies following up with a physician and/or 

gastroenterologist for the the symptoms.  In the ED, the patient was 

hemodynamically stable with labs remarkable for platelets 76, bicarb 20, and 

glucose 127.  Patient was admitted for further management, gastroenterology was 

consulted for management of dysphagia.  Gastroenterology performed an EGD 

revealing 4 cm hiatal hernia with grade 1 esophagitis at GE junction; ring GE 

junction noted; balloon dilation performed; stomach revealing mild gastritis; 

biopsies taken at GE junction and stomach.  Patient will be started back on p.o.

 Protonix 40 mg daily, the patient will follow-up with gastroenterology 

regarding his biopsies in the outpatient setting.  Patient is medically clear 

for discharge.


Follow up with: 


AVELINO CREWS MD [Staff Physician] - 14 Days


Prescriptions: 


Pantoprazole [Protonix TAB] 40 mg PO QDAC #30 tablet

## 2022-08-25 ENCOUNTER — DASHBOARD ENCOUNTERS (OUTPATIENT)
Age: 77
End: 2022-08-25

## 2022-08-25 ENCOUNTER — OFFICE VISIT (OUTPATIENT)
Dept: URBAN - METROPOLITAN AREA CLINIC 118 | Facility: CLINIC | Age: 77
End: 2022-08-25
Payer: SELF-PAY

## 2022-08-25 VITALS
HEIGHT: 66 IN | WEIGHT: 155 LBS | TEMPERATURE: 97.3 F | DIASTOLIC BLOOD PRESSURE: 93 MMHG | HEART RATE: 104 BPM | BODY MASS INDEX: 24.91 KG/M2 | SYSTOLIC BLOOD PRESSURE: 152 MMHG

## 2022-08-25 DIAGNOSIS — A04.8 H. PYLORI INFECTION: ICD-10-CM

## 2022-08-25 DIAGNOSIS — K44.9 HIATAL HERNIA: ICD-10-CM

## 2022-08-25 PROCEDURE — 99213 OFFICE O/P EST LOW 20 MIN: CPT | Performed by: INTERNAL MEDICINE

## 2022-08-25 RX ORDER — PANTOPRAZOLE SODIUM 40 MG/1
1 TABLET TABLET, DELAYED RELEASE ORAL ONCE A DAY
Qty: 90 | Refills: 3 | OUTPATIENT
Start: 2022-08-25

## 2022-08-25 RX ORDER — AMOXICILLIN 500 MG/1
2 CAPSULES CAPSULE ORAL
Qty: 56 CAPSULE | Refills: 0 | OUTPATIENT
Start: 2022-08-25 | End: 2022-09-08

## 2022-08-25 RX ORDER — CLARITHROMYCIN 500 MG/1
1 TABLET TABLET, FILM COATED ORAL
Qty: 28 TABLET | Refills: 0 | OUTPATIENT
Start: 2022-08-25 | End: 2022-09-08

## 2022-08-25 RX ORDER — OMEPRAZOLE 20 MG/1
1 CAPSULE CAPSULE, DELAYED RELEASE ORAL BID
Qty: 28 CAPSULE | Refills: 0 | OUTPATIENT
Start: 2022-08-25

## 2022-08-25 NOTE — HPI-TODAY'S VISIT:
EGD Dr Cline earlier this month showed 4cm hiatal hernia  H Pylori gastritis ring that was dilated to 20mm doing better now, no complaints